# Patient Record
Sex: MALE | Race: WHITE | NOT HISPANIC OR LATINO | URBAN - METROPOLITAN AREA
[De-identification: names, ages, dates, MRNs, and addresses within clinical notes are randomized per-mention and may not be internally consistent; named-entity substitution may affect disease eponyms.]

---

## 2017-01-17 ENCOUNTER — EMERGENCY (EMERGENCY)
Facility: HOSPITAL | Age: 27
LOS: 1 days | Discharge: HOME | End: 2017-01-17
Attending: EMERGENCY MEDICINE | Admitting: EMERGENCY MEDICINE
Payer: COMMERCIAL

## 2017-01-17 VITALS
RESPIRATION RATE: 18 BRPM | TEMPERATURE: 99 F | DIASTOLIC BLOOD PRESSURE: 77 MMHG | SYSTOLIC BLOOD PRESSURE: 133 MMHG | HEART RATE: 84 BPM | OXYGEN SATURATION: 97 %

## 2017-01-17 VITALS
OXYGEN SATURATION: 98 % | SYSTOLIC BLOOD PRESSURE: 117 MMHG | HEART RATE: 63 BPM | TEMPERATURE: 98 F | DIASTOLIC BLOOD PRESSURE: 65 MMHG | RESPIRATION RATE: 18 BRPM

## 2017-01-17 DIAGNOSIS — R11.2 NAUSEA WITH VOMITING, UNSPECIFIED: ICD-10-CM

## 2017-01-17 DIAGNOSIS — R10.9 UNSPECIFIED ABDOMINAL PAIN: ICD-10-CM

## 2017-01-17 DIAGNOSIS — R10.33 PERIUMBILICAL PAIN: ICD-10-CM

## 2017-01-17 LAB
ALBUMIN SERPL ELPH-MCNC: 4.7 G/DL — SIGNIFICANT CHANGE UP (ref 3.3–5)
ALP SERPL-CCNC: 60 U/L — SIGNIFICANT CHANGE UP (ref 40–120)
ALT FLD-CCNC: 17 U/L RC — SIGNIFICANT CHANGE UP (ref 10–45)
ANION GAP SERPL CALC-SCNC: 15 MMOL/L — SIGNIFICANT CHANGE UP (ref 5–17)
APTT BLD: 30.9 SEC — SIGNIFICANT CHANGE UP (ref 27.5–37.4)
AST SERPL-CCNC: 18 U/L — SIGNIFICANT CHANGE UP (ref 10–40)
BASOPHILS # BLD AUTO: 0 K/UL — SIGNIFICANT CHANGE UP (ref 0–0.2)
BASOPHILS NFR BLD AUTO: 0 % — SIGNIFICANT CHANGE UP (ref 0–2)
BILIRUB SERPL-MCNC: 5.6 MG/DL — HIGH (ref 0.2–1.2)
BLD GP AB SCN SERPL QL: NEGATIVE — SIGNIFICANT CHANGE UP
BUN SERPL-MCNC: 18 MG/DL — SIGNIFICANT CHANGE UP (ref 7–23)
CALCIUM SERPL-MCNC: 9.7 MG/DL — SIGNIFICANT CHANGE UP (ref 8.4–10.5)
CHLORIDE SERPL-SCNC: 96 MMOL/L — SIGNIFICANT CHANGE UP (ref 96–108)
CO2 SERPL-SCNC: 27 MMOL/L — SIGNIFICANT CHANGE UP (ref 22–31)
CREAT SERPL-MCNC: 1.2 MG/DL — SIGNIFICANT CHANGE UP (ref 0.5–1.3)
EOSINOPHIL # BLD AUTO: 0.1 K/UL — SIGNIFICANT CHANGE UP (ref 0–0.5)
EOSINOPHIL NFR BLD AUTO: 1.5 % — SIGNIFICANT CHANGE UP (ref 0–6)
GAS PNL BLDV: SIGNIFICANT CHANGE UP
GLUCOSE SERPL-MCNC: 99 MG/DL — SIGNIFICANT CHANGE UP (ref 70–99)
HCT VFR BLD CALC: 45.3 % — SIGNIFICANT CHANGE UP (ref 39–50)
HGB BLD-MCNC: 15.9 G/DL — SIGNIFICANT CHANGE UP (ref 13–17)
INR BLD: 1.38 RATIO — HIGH (ref 0.88–1.16)
LYMPHOCYTES # BLD AUTO: 1 K/UL — SIGNIFICANT CHANGE UP (ref 1–3.3)
LYMPHOCYTES # BLD AUTO: 10.5 % — LOW (ref 13–44)
MCHC RBC-ENTMCNC: 29.3 PG — SIGNIFICANT CHANGE UP (ref 27–34)
MCHC RBC-ENTMCNC: 35.1 GM/DL — SIGNIFICANT CHANGE UP (ref 32–36)
MCV RBC AUTO: 83.3 FL — SIGNIFICANT CHANGE UP (ref 80–100)
MONOCYTES # BLD AUTO: 1 K/UL — HIGH (ref 0–0.9)
MONOCYTES NFR BLD AUTO: 9.8 % — SIGNIFICANT CHANGE UP (ref 2–14)
NEUTROPHILS # BLD AUTO: 7.7 K/UL — HIGH (ref 1.8–7.4)
NEUTROPHILS NFR BLD AUTO: 78.2 % — HIGH (ref 43–77)
PLATELET # BLD AUTO: 100 K/UL — LOW (ref 150–400)
POTASSIUM SERPL-MCNC: 4.2 MMOL/L — SIGNIFICANT CHANGE UP (ref 3.5–5.3)
POTASSIUM SERPL-SCNC: 4.2 MMOL/L — SIGNIFICANT CHANGE UP (ref 3.5–5.3)
PROT SERPL-MCNC: 7.5 G/DL — SIGNIFICANT CHANGE UP (ref 6–8.3)
PROTHROM AB SERPL-ACNC: 15 SEC — HIGH (ref 10–13.1)
RBC # BLD: 5.43 M/UL — SIGNIFICANT CHANGE UP (ref 4.2–5.8)
RBC # FLD: 13.3 % — SIGNIFICANT CHANGE UP (ref 10.3–14.5)
RH IG SCN BLD-IMP: POSITIVE — SIGNIFICANT CHANGE UP
SODIUM SERPL-SCNC: 138 MMOL/L — SIGNIFICANT CHANGE UP (ref 135–145)
WBC # BLD: 9.8 K/UL — SIGNIFICANT CHANGE UP (ref 3.8–10.5)
WBC # FLD AUTO: 9.8 K/UL — SIGNIFICANT CHANGE UP (ref 3.8–10.5)

## 2017-01-17 PROCEDURE — 86900 BLOOD TYPING SEROLOGIC ABO: CPT

## 2017-01-17 PROCEDURE — 99284 EMERGENCY DEPT VISIT MOD MDM: CPT | Mod: 25

## 2017-01-17 PROCEDURE — 96375 TX/PRO/DX INJ NEW DRUG ADDON: CPT

## 2017-01-17 PROCEDURE — 86850 RBC ANTIBODY SCREEN: CPT

## 2017-01-17 PROCEDURE — 85610 PROTHROMBIN TIME: CPT

## 2017-01-17 PROCEDURE — 82330 ASSAY OF CALCIUM: CPT

## 2017-01-17 PROCEDURE — 86901 BLOOD TYPING SEROLOGIC RH(D): CPT

## 2017-01-17 PROCEDURE — 99285 EMERGENCY DEPT VISIT HI MDM: CPT

## 2017-01-17 PROCEDURE — 96374 THER/PROPH/DIAG INJ IV PUSH: CPT

## 2017-01-17 PROCEDURE — 85730 THROMBOPLASTIN TIME PARTIAL: CPT

## 2017-01-17 PROCEDURE — 82803 BLOOD GASES ANY COMBINATION: CPT

## 2017-01-17 PROCEDURE — 84132 ASSAY OF SERUM POTASSIUM: CPT

## 2017-01-17 PROCEDURE — 80053 COMPREHEN METABOLIC PANEL: CPT

## 2017-01-17 PROCEDURE — 85027 COMPLETE CBC AUTOMATED: CPT

## 2017-01-17 PROCEDURE — 84295 ASSAY OF SERUM SODIUM: CPT

## 2017-01-17 PROCEDURE — 82947 ASSAY GLUCOSE BLOOD QUANT: CPT

## 2017-01-17 PROCEDURE — 82435 ASSAY OF BLOOD CHLORIDE: CPT

## 2017-01-17 PROCEDURE — 83605 ASSAY OF LACTIC ACID: CPT

## 2017-01-17 PROCEDURE — 85014 HEMATOCRIT: CPT

## 2017-01-17 RX ORDER — FAMOTIDINE 10 MG/ML
20 INJECTION INTRAVENOUS ONCE
Qty: 0 | Refills: 0 | Status: COMPLETED | OUTPATIENT
Start: 2017-01-17 | End: 2017-01-17

## 2017-01-17 RX ORDER — SODIUM CHLORIDE 9 MG/ML
2000 INJECTION INTRAMUSCULAR; INTRAVENOUS; SUBCUTANEOUS ONCE
Qty: 0 | Refills: 0 | Status: COMPLETED | OUTPATIENT
Start: 2017-01-17 | End: 2017-01-17

## 2017-01-17 RX ORDER — ONDANSETRON 8 MG/1
4 TABLET, FILM COATED ORAL ONCE
Qty: 0 | Refills: 0 | Status: COMPLETED | OUTPATIENT
Start: 2017-01-17 | End: 2017-01-17

## 2017-01-17 RX ORDER — ONDANSETRON 8 MG/1
1 TABLET, FILM COATED ORAL
Qty: 16 | Refills: 0 | OUTPATIENT
Start: 2017-01-17 | End: 2017-01-21

## 2017-01-17 RX ORDER — SODIUM CHLORIDE 9 MG/ML
1000 INJECTION INTRAMUSCULAR; INTRAVENOUS; SUBCUTANEOUS ONCE
Qty: 0 | Refills: 0 | Status: COMPLETED | OUTPATIENT
Start: 2017-01-17 | End: 2017-01-17

## 2017-01-17 RX ADMIN — SODIUM CHLORIDE 2000 MILLILITER(S): 9 INJECTION INTRAMUSCULAR; INTRAVENOUS; SUBCUTANEOUS at 17:37

## 2017-01-17 RX ADMIN — SODIUM CHLORIDE 2000 MILLILITER(S): 9 INJECTION INTRAMUSCULAR; INTRAVENOUS; SUBCUTANEOUS at 18:26

## 2017-01-17 RX ADMIN — FAMOTIDINE 20 MILLIGRAM(S): 10 INJECTION INTRAVENOUS at 17:37

## 2017-01-17 RX ADMIN — ONDANSETRON 4 MILLIGRAM(S): 8 TABLET, FILM COATED ORAL at 17:37

## 2017-01-17 NOTE — ED PROVIDER NOTE - PROGRESS NOTE DETAILS
Dr. Morrow: Pt reports that he is feeling much better. Shared decision making with pt and his mother regarding risk benefit for CT scan. Risks of missing appendicitis have been explained to pt and his mom, they defer CT imaging at this time and are aware that they may return at any time for imaging if sx worsen or persist. He was able to tolerate crackers and ginger ale. He has been DC'd with a copy of his labs and Rx for zofran. He has b een advised that if his sx do not resolve in next 24 hours or is he starts to feel worse, run fever, he should return to ED for further eval.

## 2017-01-17 NOTE — ED PROCEDURE NOTE - PROCEDURE ADDITIONAL DETAILS
Emergency Department Focused Ultrasound performed at patient's bedside.  The complete report can be found in PACS.   Appendix not visualized

## 2017-01-17 NOTE — ED PROVIDER NOTE - MEDICAL DECISION MAKING DETAILS
25 y/o M pmhx Gilbert's disease presenting with n/v/d associated with fever x2 days sent in by PMD for r/o appy after having mild RLQ tenderness on his exam in office today. Pt reports unable to tolerate PO today at all. PE remarkable for mild RLQ tenderness, no appendicial signs, moist mucous membranes Will obtain surgical labs, give fluids and pain control, abdominal US to r/o appy, if unable to see will obtain CT 27 y/o M pmhx Gilbert's disease presenting with n/v/d associated with fever x2 days sent in by PMD for r/o appy after having mild RLQ tenderness on his exam in office today. Pt reports unable to tolerate PO today at all. PE remarkable for mild RLQ tenderness, no appendicial signs, moist mucous membranes Will obtain surgical labs, give fluids and pain control, abdominal US to r/o appy, if unable to see will obtain CT  [place]: See attending statement below

## 2017-01-17 NOTE — ED PROVIDER NOTE - ATTENDING CONTRIBUTION TO CARE
25yo M hx of [place] syndrome here with N/V/D x48 hours associated with fever Tmax of 103F last night. Pt reports mild crampy intermittent abd pain in periumbilical area, however when seen by PCP today pain was localized to RLQ with deep palpation. Reporst not able to take in much in way of food or drink. Has acquaintances who have been sick with similar.  On exam skion is slight yellow hue, mucous memb dry, neck supple, RRR no murmur. CTA BL. +BS soft mild periumbilical TTP. No rebound or guarding. Neuro intact. Ext wwp, no edema. Plan: Labs, IVF, abd sono and re-eval. Consider CT if sono unable to visualize appendix. 25yo M hx of [place] syndrome here with N/V/D x48 hours associated with fever Tmax of 103F last night. Pt reports mild crampy intermittent abd pain in periumbilical area, however when seen by PCP today pain was localized to RLQ with deep palpation. Reporst not able to take in much in way of food or drink. Has acquaintances who have been sick with similar.  On exam skin is slight yellow hue, mucous memb dry, neck supple, RRR no murmur. CTA BL. +BS soft mild periumbilical TTP. No rebound or guarding. Neuro intact. Ext wwp, no edema. Plan: Labs, IVF, abd sono and re-eval. Consider CT if sono unable to visualize appendix.

## 2017-01-17 NOTE — ED PROVIDER NOTE - OBJECTIVE STATEMENT
27 y/o M pmhx Gilbert's disease presenting with abdominal pain, nausea, vomiting and diarrhea x2 days associated with fever. Pt reports yesterday he had a fever of 103F. Has had >6 episodes of vomiting and diarrhea since yesterday. Reports he went to his PMD this morning, Dr. Ness, who sent him in for r/o appendicitis, as he had some right lower abdominal tenderness in his office. Pt reports last episode of vomiting was at 1pm today, and last episode of diarrhea was 2hours ago. He denies fever today. Reports inability to tolerate any PO today. No blood in stool or vomit. 25 y/o M pmhx Gilbert's disease presenting with abdominal pain, nausea, vomiting and diarrhea x2 days associated with fever. Pt reports yesterday he had a fever of 103F. Has had >6 episodes of vomiting and diarrhea since yesterday. Reports he went to his PMD this morning, Dr. Ness, who sent him in for r/o appendicitis, as he had some right lower abdominal tenderness in his office. Pt reports last episode of vomiting was at 1pm today, and last episode of diarrhea was 2hours ago. He denies fever today. Reports inability to tolerate any PO today. No blood in stool or vomit. Reports he has acquaintances with similar symptoms as well, and rides the subway for commuting to work.

## 2017-01-17 NOTE — ED ADULT NURSE NOTE - OBJECTIVE STATEMENT
27 yo male presents in the ed for abdominal pain. Pt is alert and oriented x4, speaking coherently. Pt states that abdominal pain stated last night with a fever and vomiting. Pt abdomen soft, non tender. Lungs clear to ascultation. Pt denies sick contact. Last vomited this morning. MD at bedside, will continue to monitor.

## 2017-01-17 NOTE — ED PROVIDER NOTE - CARE PLAN
Principal Discharge DX:	Abdominal pain  Instructions for follow-up, activity and diet:	1. Stay well hydrated, drink plenty of fluids. You may take Tylenol 650mg every 6 hours as needed.   2. Follow up with your Primary Care Physician as soon as possible for further evaluation.   3. Return to the Emergency Department immediately if pain persists or for any other concerning symptoms.

## 2017-01-17 NOTE — ED ADULT NURSE REASSESSMENT NOTE - NS ED NURSE REASSESS COMMENT FT1
Pt tolerating PO challenge with crackers and ginger ale. Pt is in no current distress. Comfort and safety provided. Family at bedside.

## 2024-09-27 NOTE — ED PROVIDER NOTE - PLAN OF CARE
Surgery Surgery Surgery Pain Medicine Pain Medicine 1. Stay well hydrated, drink plenty of fluids. You may take Tylenol 650mg every 6 hours as needed.   2. Follow up with your Primary Care Physician as soon as possible for further evaluation.   3. Return to the Emergency Department immediately if pain persists or for any other concerning symptoms.